# Patient Record
Sex: FEMALE | URBAN - METROPOLITAN AREA
[De-identification: names, ages, dates, MRNs, and addresses within clinical notes are randomized per-mention and may not be internally consistent; named-entity substitution may affect disease eponyms.]

---

## 2022-05-20 ENCOUNTER — APPOINTMENT (RX ONLY)
Dept: URBAN - METROPOLITAN AREA CLINIC 55 | Facility: CLINIC | Age: 47
Setting detail: DERMATOLOGY
End: 2022-05-20

## 2022-05-20 DIAGNOSIS — Z41.9 ENCOUNTER FOR PROCEDURE FOR PURPOSES OTHER THAN REMEDYING HEALTH STATE, UNSPECIFIED: ICD-10-CM

## 2022-05-20 PROCEDURE — ? SCLEROTHERAPY (COSMETIC)

## 2022-05-20 PROCEDURE — ? COSMETIC CONSULTATION: SCLEROTHERAPY

## 2022-05-20 ASSESSMENT — LOCATION SIMPLE DESCRIPTION DERM
LOCATION SIMPLE: LEFT POPLITEAL SKIN
LOCATION SIMPLE: RIGHT POPLITEAL SKIN
LOCATION SIMPLE: RIGHT THIGH

## 2022-05-20 ASSESSMENT — LOCATION ZONE DERM: LOCATION ZONE: LEG

## 2022-05-20 ASSESSMENT — LOCATION DETAILED DESCRIPTION DERM
LOCATION DETAILED: RIGHT ANTERIOR DISTAL THIGH
LOCATION DETAILED: LEFT POPLITEAL SKIN
LOCATION DETAILED: RIGHT LATERAL POPLITEAL SKIN

## 2022-05-20 NOTE — PROCEDURE: SCLEROTHERAPY (COSMETIC)
Price (Use Numbers Only, No Special Characters Or $): 856
Treatment Number (Optional): 400
Consent was obtained with risks, benefits, and alternatives discussed for the above procedures.  Photographs were taken.
Sclerosant Volume (Cc): 10
Post-Care Instructions: Compression for 7 days post procedure.. Compliance with compression helps to prevent blood clots and minimizes pain, swelling, bruising, skin discoloration (staining) and the recurrence of vessels. compression is needed only during waking hours.  However, if your leg feels better with compression at night, then you may continue compression at night as  needed. For 2 days after treatment: Avoid aerobic exercise, weight training, and all other types of exertion. Do not get a tan for one month after sclerotherapy. Tanning increases your risk of skin discoloration. After 24 hours, you may shower or bathe in tepid water. Avoid immersion in hot tubs.For pain or discomfort you may take acetaminophen. Possible side effects following sclerotherapy  After sclerotherapy, mild swelling is expected. The injection sites may also become bruised. You may also experience one or more of the following side effects, which almost always go away within one to four months:       Darkening of the injected veins, brownish staining of the skin, small clotted vessels under the surface of the skin that you can feel . If you experience any of the following: treated areas become increasingly sore, tender, red or warm or significant swelling or pain in the leg please call [facility].
Disposition: Compression stockings applied by patient post procedure.
Sclerosant Volume (Cc): 6
Detail Level: Detailed